# Patient Record
Sex: MALE | Race: WHITE
[De-identification: names, ages, dates, MRNs, and addresses within clinical notes are randomized per-mention and may not be internally consistent; named-entity substitution may affect disease eponyms.]

---

## 2017-05-08 ENCOUNTER — HOSPITAL ENCOUNTER (EMERGENCY)
Dept: HOSPITAL 80 - FED | Age: 77
Discharge: HOME | End: 2017-05-08
Payer: COMMERCIAL

## 2017-05-08 VITALS
DIASTOLIC BLOOD PRESSURE: 94 MMHG | RESPIRATION RATE: 16 BRPM | HEART RATE: 89 BPM | OXYGEN SATURATION: 96 % | TEMPERATURE: 98.1 F | SYSTOLIC BLOOD PRESSURE: 162 MMHG

## 2017-05-08 DIAGNOSIS — S68.127A: Primary | ICD-10-CM
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
## 2017-05-08 NOTE — EDPHY
H & P


Stated Complaint: lac to l paula fingetr


HPI/ROS: 





Chief complaint:  Left pinky finger laceration





History of present illness:  76-year-old male presents to the emergency 

department for left pinky finger laceration.  Patient was using a cooking knife 

when it slipped cutting the tip of his finger.  There has been bleeding.  There 

has been pain. He denies abnormal coolness or paresthesias in the finger.  He 

is moving the finger well.  His tetanus is up-to-date.





- Personal History


Current Tetanus/Diphtheria Vaccine: Yes


Current Tetanus Diphtheria and Acellular Pertussis (TDAP): Yes





- Medical/Surgical History


Hx Asthma: No


Hx Chronic Respiratory Disease: No


Hx Diabetes: No


Hx Cardiac Disease: No


Hx Renal Disease: No


Hx Cirrhosis: No


Hx Alcoholism: No


Hx HIV/AIDS: No


Hx Splenectomy or Spleen Trauma: No


Other PMH: back surgery laminectomy





- Social History


Smoking Status: Never smoked





- Physical Exam


Exam: 





General:  Alert, nontoxic


Skin:  Very small amputation of the very distal tip of the left 5th finger.  No 

deep structures visualized.


Musculoskeletal:  Patient is flexing and extending his left 5th finger in the 

DIP, PIP and MCP joint well.


Vascular:  Capillary refill brisk in the left 5th finger.  


Neurologic:  Sensation appears to be intact on the 5th finger using light touch 

and two-point discrimination.


Constitutional: 


 Initial Vital Signs











Temperature (C)  36.7 C   05/08/17 20:04


 


Heart Rate  89   05/08/17 20:04


 


Respiratory Rate  16   05/08/17 20:04


 


Blood Pressure  162/94 H  05/08/17 20:04


 


O2 Sat (%)  96   05/08/17 20:04











Allergies/Adverse Reactions: 


 





No Known Allergies Allergy (Unverified 05/08/17 20:04)


 








Home Medications: 














 Medication  Instructions  Recorded


 


NK [No Known Home Meds]  05/08/17


 


TESTOSTERONE  05/08/17














Medical Decision Making


ED Course/Re-evaluation: 





Patient seen under the supervision of my secondary supervising physician Dr. Rajeev Chauhan.  Patient presents to the emergency department for a cut to the 

tip of his left 5th finger.  The finger is neurovascularly intact, he has good 

musculoskeletal control.  His tetanus is up-to-date.  The wound is cleaned and 

dressed.  He is referred to hand surgery for recheck.  Return precautions are 

given.  Patient voiced understanding and agreement with plan.


Differential Diagnosis: 





Included but not limited to superficial soft tissue injury, deep structure 

injury, foreign body contamination





Departure





- Departure


Disposition: Home, Routine, Self-Care


Clinical Impression: 


Amputated finger


Qualifiers:


 Encounter type: initial encounter Qualified Code(s): S68.119A - Complete 

traumatic metacarpophalangeal amputation of unspecified finger, initial 

encounter





Condition: Good


Instructions:  Finger Amputation (ED)


Additional Instructions: 


Follow-up with a hand doctor for recheck





If symptoms worsen or new symptoms develop return to the emergency room for 

recheck


Referrals: 


UNKNOWN,PCP [Other] - As per Instructions


Brad Karimi MD [Medical Doctor] - As per Instructions

## 2018-01-02 ENCOUNTER — HOSPITAL ENCOUNTER (OUTPATIENT)
Dept: HOSPITAL 80 - GIMAGING | Age: 78
End: 2018-01-02
Attending: NURSE PRACTITIONER
Payer: COMMERCIAL

## 2018-01-02 DIAGNOSIS — S22.42XA: Primary | ICD-10-CM

## 2018-01-16 ENCOUNTER — HOSPITAL ENCOUNTER (OUTPATIENT)
Dept: HOSPITAL 80 - FCPNEURO | Age: 78
End: 2018-01-16
Attending: INTERNAL MEDICINE
Payer: COMMERCIAL

## 2018-01-16 DIAGNOSIS — G47.33: Primary | ICD-10-CM

## 2018-07-03 ENCOUNTER — HOSPITAL ENCOUNTER (OUTPATIENT)
Dept: HOSPITAL 80 - FED | Age: 78
Setting detail: OBSERVATION
LOS: 1 days | Discharge: HOME | End: 2018-07-04
Attending: INTERNAL MEDICINE | Admitting: INTERNAL MEDICINE
Payer: COMMERCIAL

## 2018-07-03 DIAGNOSIS — G47.33: ICD-10-CM

## 2018-07-03 DIAGNOSIS — J98.11: ICD-10-CM

## 2018-07-03 DIAGNOSIS — Z82.49: ICD-10-CM

## 2018-07-03 DIAGNOSIS — R03.0: ICD-10-CM

## 2018-07-03 DIAGNOSIS — S22.42XD: ICD-10-CM

## 2018-07-03 DIAGNOSIS — R53.83: ICD-10-CM

## 2018-07-03 DIAGNOSIS — I34.1: ICD-10-CM

## 2018-07-03 DIAGNOSIS — R07.9: Primary | ICD-10-CM

## 2018-07-03 DIAGNOSIS — R94.31: ICD-10-CM

## 2018-07-03 LAB — PLATELET # BLD: 241 10^3/UL (ref 150–400)

## 2018-07-03 PROCEDURE — G0378 HOSPITAL OBSERVATION PER HR: HCPCS

## 2018-07-03 PROCEDURE — 71046 X-RAY EXAM CHEST 2 VIEWS: CPT

## 2018-07-03 PROCEDURE — 99285 EMERGENCY DEPT VISIT HI MDM: CPT

## 2018-07-03 PROCEDURE — 93005 ELECTROCARDIOGRAM TRACING: CPT

## 2018-07-03 PROCEDURE — 93306 TTE W/DOPPLER COMPLETE: CPT

## 2018-07-03 NOTE — GHP
[f rep st]



                                                            HISTORY AND PHYSICAL





DATE OF ADMISSION:  07/03/2018



CHIEF COMPLAINT:  Chest pain.



HISTORY:  This is a 77-year-old man with a past medical history that includes costochondritis, recent
 rib fractures and mitral valve prolapse, who presents with chest pain and decreased energy for the l
ast several days.  The patient notes that his chest pain is located in his left sternal region and it
 feels like pressure without radiation or other associated symptoms.  He notes it is slightly differe
nt than his prior costochondritis episode.  However, after obtaining an echocardiogram in the emergen
cy department, he has some tenderness located over the site of the probe that he states is more simil
ar to his costochondritis.  He has had increased blood pressure and heart rate as well over the cours
e of the day today, and for that reason, he decided to come to the emergency room for further evaluat
ion.



PAST MEDICAL HISTORY:  Includes:

1.  Mitral valve prolapse.

2.  Costochondritis.

3.  Recent rib fractures.



PAST SURGICAL HISTORY:  

1.  Laminectomy.

2.  Vein surgery.



FAMILY HISTORY:  The patient has a paternal grandfather who had an MI.  Father also with heart diseas
e, but lived to be .



SOCIAL HISTORY:  The patient is a nonsmoker, nondrinker, nondrug user.  He lives independently.  He i
s not .



REVIEW OF SYSTEMS:  10-point review of systems obtained, negative except as per HPI.



HOME MEDICATIONS:  Multivitamin.



ALLERGIES:  No known drug allergies.



PHYSICAL EXAM:  VITAL SIGNS:  /92, heart rate 70, respiratory rate 18, O2 sats 98% on room air,
 temperature is 36.8.  GENERAL APPEARANCE:  This is a well-developed, well-nourished man.  He is awak
e and alert.  He is in no acute distress.  EYES:  Anicteric.  CARDIOVASCULAR:  Regular rate and rhyth
m, occasional systolic click.  PULMONARY:  CTA bilaterally.  Normal work of breathing.  ABDOMEN:  Sof
t, nontender, nondistended.  EXTREMITIES:  No clubbing, cyanosis, or edema.  SKIN:  Hyperpigmentation
 and varicose veins consistent with chronic venous stasis.



CLINICAL DATA:  Labs reviewed and notable for white blood cell count of 7.27, hematocrit of 39, plate
lets of 241.  Chemistry is unremarkable.  Troponin is 0. 



EKG, personally reviewed and interpreted, shows sinus rhythm, evidence of first-degree AV block. 



Chest x-ray, personally reviewed and interpreted, shows no acute findings.



ASSESSMENT AND PLAN:  This is a 77-year-old man with past medical history of costochondritis and mitr
al valve prolapse, presenting with chest pain.

1.  Chest pain, somewhat concerning, though overall does sound atypical.  Initial troponin negative. 
 Echocardiogram performed and report pending.  Note, does appear his ejection fraction was normal.  T
his could certainly be a recurrent costochondritis, especially given recurrent pain with pressure ove
r the area.  Plan will be to trend troponins overnight and monitor on telemetry.  Cardiology has been
 consulted and will defer to them regarding stress test modality, but expect the patient will require
 stress test prior to discharge.  Chest x-ray does not reveal any continued evidence of rib fracture,
 so suspect this is not the etiology for his pain.

2.  Mitral valve prolapse.  Echocardiogram has been performed with report pending.  He does not appea
r to be in acute heart failure.

3.  History of costochondritis as per problem #1.  Patient is currently chest pain free.

4.  Recent rib fractures status post mechanical fall.  They do appear to be healed on chest x-ray.

5.  Observation status.  Suspect patient will need less than 48 hours stay for evaluation and managem
ent of above.

6.  Patient is new to my care.  Old records reviewed and summarized as per HPI and past medical histo
ry.  Care plan reviewed with ER physician including plans for Cardiology consult.





Job #:  [id number]

## 2018-07-03 NOTE — CPEKG
Heart Rate: 92

RR Interval: 652

P-R Interval: 220

QRSD Interval: 100

QT Interval: 356

QTC Interval: 441

P Axis: 87

QRS Axis: 92

T Wave Axis: 70

EKG Severity - ABNORMAL ECG -

EKG Impression: SINUS RHYTHM

EKG Impression: FIRST DEGREE AV BLOCK

EKG Impression: BIATRIAL ABNORMALITIES

EKG Impression: CONSIDER RIGHT VENTRICULAR HYPERTROPHY

Electronically Signed By: Phu Strickland 03-Jul-2018 20:05:11

## 2018-07-03 NOTE — EDPHY
H & P


Stated Complaint: cp/fatigue x 3 days


Time Seen by Provider: 07/03/18 12:51





- Personal History


Current Tetanus Diphtheria and Acellular Pertussis (TDAP): Yes





- Medical/Surgical History


Hx Asthma: No


Hx Chronic Respiratory Disease: No


Hx Diabetes: No


Hx Cardiac Disease: Yes


Hx Renal Disease: No


Hx Cirrhosis: No


Hx Alcoholism: No


Hx HIV/AIDS: No


Hx Splenectomy or Spleen Trauma: No


Other PMH: back surgery laminectomy.  sleep apnea.  mitral valve prolapse





- Social History


Smoking Status: Never smoked


Constitutional: 


 Initial Vital Signs











Temperature (C)  36.7 C   07/03/18 12:12


 


Heart Rate  103 H  07/03/18 12:12


 


Respiratory Rate  18   07/03/18 12:12


 


Blood Pressure  159/93 H  07/03/18 12:12


 


O2 Sat (%)  93   07/03/18 12:12








 











O2 Delivery Mode               Room Air














Allergies/Adverse Reactions: 


 





No Known Allergies Allergy (Verified 07/03/18 12:11)


 








Home Medications: 














 Medication  Instructions  Recorded


 


Multivitamins [Multivitamin (*)] 1 each PO DAILY 07/03/18














Medical Decision Making





- Diagnostics


Imaging Results: 


 Imaging Impressions





Chest X-Ray  07/03/18 12:59


Impression: Left basilar subsegmental atelectasis. No acute cardiopulmonary 

process.


 


 


 











Imaging: Discussed imaging studies w/ On call Radiologist, I viewed and 

interpreted images myself


ED Course/Re-evaluation: 





CHIEF COMPLAINT: Chest pain 





HISTORY OF PRESENT ILLNESS:  


The patient is a 78 y/o male with a history of mitral valve prolapse, 

costochondritis, and fractured ribs complaining of worsening chest pain. Around 

six months ago he fractured 2 ribs, for which he now sees an osteopath. Since 

fracturing the ribs he has had decreasing energy levels and very mild chest 

discomfort. For the past couple of days this chest pain has increased and 

occasionally radiates into his left shoulder. This pain feels more pressure and 

dull than prior costochondritis episodes. This morning his blood pressure was 

150/90, which is higher than his normal 120/80; his pulse was also elevated at 

100. Due to these vital signs he decided to present to the emergency 

department. He did see Dr. Louie, cardiologist, around 5 years ago for a 

cardiac stress test and an echocardiogram. Denies nausea, vomiting, shortness 

of breath, dizziness, numbness, paresthesias, fever.





REVIEW OF SYSTEMS:  





A 10 point review of systems was performed and is negative with the exception 

of the elements mentioned in the history of present illness.





PHYSICAL EXAM:  





HR, BP, O2 Sat, RR.  Temp noted


General Appearance:  Alert, well hydrated, appropriate, and non-toxic appearing.


Head:  Atraumatic without scalp tenderness or obvious injury


Eyes:  Pupils equal, round, reactive to light and accommodation, EOMI, no trauma

, no injection.


Ears:  Clear bilaterally, no perforation, normal landmarks


Nose:  Atraumatic, no rhinorrhea, clear.


Throat:  There is no erythema or exudates, no lesions, normal tonsils, mucus 

membranes moist.


Neck:  Supple, nontender, no lymphadenopathy.


Respiratory:  No retractions, no distress, no wheezes, and no accessory muscle 

use.  Lungs are clear to auscultation bilaterally.


Cardiovascular:  Regular rate and rhythm, no murmurs, rubs, or gallops. 

Bilateral carotid, radial, dorsalis pedis, and posterior tibial pulses intact. 

Good capillary refill all extremities.


Gastrointestinal:  Abdomen is soft, nontender, non-distended, no masses, no 

rebound, no guarding, no peritoneal signs.


Musculoskeletal:  Normal active ROM of all extremities, atraumatic.


Neurological:  Alert, appropriate, and interactive. Non-focal neuro.


Skin:  No rashes, good turgor, no nodules on palpation.





Past medical history: Mitral valve prolapse, costochondritis, fractured ribs, 

sleep apnea


Past surgical history: Laminectomy


Family history: Denies


Social history: Lives in [place], single, retired





DIAGNOSTICS/PROCEDURES/CRITICAL CARE TIME:  





EKG: The 12 lead EKG was interpreted by myself as sinus rhythm with a rate of 92

, first degree AV block, nonspecific ST-T changes in the inferior lateral 

leads. See hard copy and/or "tracemaster" electronic copy for interpretation.





Chest x-ray: Left basilar subsegmental atelectasis





Echocardiogram: Pending at time of going to the floor.





DIFFERENTIAL DIAGNOSIS:   


The differential diagnosis for the patient's chest pain included but was not 

limited to myocardial ischemia, pulmonary embolus, chest wall pain, pleural 

inflammation, and pulmonary infectious causes.





MEDICAL DECISION MAKING:  


The patient is a 78 y/o male with a history of mitral valve prolapse, 

costochondritis, and fractured ribs presenting with worsening chest pain and 

pressure. His physical exam is normal. EKG, chest x-ray, and labs ordered; 

324mg PO Aspirin given.





1221: I interpreted EKG as sinus rhythm with a rate of 92, first degree AV block

, nonspecific ST-T changes in the inferior lateral leads. In spite of patient's 

age and progressing symptoms, he will need to be admitted for further workup 

and observation.





1259: Patient's chest x-ray reveals left basilar subsegmental atelectasis.





1314: Consulted with Dr. Alvarado, cardiologist, who agrees to consult on this 

patient during his admission.





1315: Patient has a normal troponin.





1338: Consulted with hospitalist service, Dr. Quijano accepts admission of 

this patient to the PCU.





1342: Reassessed patient and discussed laboratory and imaging findings; 

echocardiogram ordered. I have also discussed plan for admission which he is 

comfortable with.








- Data Points


Laboratory Results: 


 Laboratory Results





 07/03/18 12:25 





 07/03/18 12:25 





 











  07/03/18 07/03/18 07/03/18





  12:30 12:25 12:25


 


WBC      7.27 10^3/uL 10^3/uL





     (3.80-9.50) 


 


RBC      4.36 10^6/uL L 10^6/uL





     (4.40-6.38) 


 


Hgb      13.4 g/dL L g/dL





     (13.7-17.5) 


 


Hct      39.0 % L %





     (40.0-51.0) 


 


MCV      89.4 fL fL





     (81.5-99.8) 


 


MCH      30.7 pg pg





     (27.9-34.1) 


 


MCHC      34.4 g/dL g/dL





     (32.4-36.7) 


 


RDW      15.2 % %





     (11.5-15.2) 


 


Plt Count      241 10^3/uL 10^3/uL





     (150-400) 


 


MPV      9.3 fL fL





     (8.7-11.7) 


 


Neut % (Auto)      80.0 % H %





     (39.3-74.2) 


 


Lymph % (Auto)      12.1 % L %





     (15.0-45.0) 


 


Mono % (Auto)      6.6 % %





     (4.5-13.0) 


 


Eos % (Auto)      0.3 % L %





     (0.6-7.6) 


 


Baso % (Auto)      0.6 % %





     (0.3-1.7) 


 


Nucleat RBC Rel Count      0.0 % %





     (0.0-0.2) 


 


Absolute Neuts (auto)      5.82 10^3/uL 10^3/uL





     (1.70-6.50) 


 


Absolute Lymphs (auto)      0.88 10^3/uL L 10^3/uL





     (1.00-3.00) 


 


Absolute Monos (auto)      0.48 10^3/uL 10^3/uL





     (0.30-0.80) 


 


Absolute Eos (auto)      0.02 10^3/uL L 10^3/uL





     (0.03-0.40) 


 


Absolute Basos (auto)      0.04 10^3/uL 10^3/uL





     (0.02-0.10) 


 


Absolute Nucleated RBC      0.00 10^3/uL 10^3/uL





     (0-0.01) 


 


Immature Gran %      0.4 % %





     (0.0-1.1) 


 


Immature Gran #      0.03 10^3/uL 10^3/uL





     (0.00-0.10) 


 


Sodium    136 mEq/L mEq/L  





    (135-145)  


 


Potassium    3.7 mEq/L mEq/L  





    (3.3-5.0)  


 


Chloride    103 mEq/L mEq/L  





    ()  


 


Carbon Dioxide    27 mEq/l mEq/l  





    (22-31)  


 


Anion Gap    6 mEq/L L mEq/L  





    (8-16)  


 


BUN    14 mg/dL mg/dL  





    (7-23)  


 


Creatinine    0.7 mg/dL mg/dL  





    (0.7-1.3)  


 


Estimated GFR    > 60   





    


 


Glucose    100 mg/dL mg/dL  





    ()  


 


Calcium    9.2 mg/dL mg/dL  





    (8.5-10.4)  


 


POC Troponin I  0.00 ng/mL ng/mL    





   (0.00-0.08)   


 


NT-Pro-B Natriuret Pep    209 pg/mL pg/mL  





    (0-450)  











Medications Given: 


 








Discontinued Medications





Aspirin (Aspirin)  324 mg PO EDNOW ONE


   Stop: 07/03/18 13:00


   Last Admin: 07/03/18 13:21 Dose:  324 mg





Point of Care Test Results: 


 Chemistry











  07/03/18





  12:30


 


POC Troponin I  0.00 ng/mL ng/mL





   (0.00-0.08) 














Departure





- Departure


Disposition: [facility] Inpatient Acute


Clinical Impression: 


 Chest wall pain, Abnormal EKG, Atelectasis of left lung





Chest pain


Qualifiers:


 Chest pain type: other chest pain Qualified Code(s): R07.89 - Other chest pain





Condition: Fair


Report Scribed for: Phu Strickland


Report Scribed by: Basilia Jason


Date of Report: 07/03/18


Time of Report: 12:52

## 2018-07-03 NOTE — ECHO
[email or web address]





[address] [address] 

Main: [phone] 



Fax: 



Transthoracic Echocardiogram 

Name:             WILMAR HARKINS                             MR#:

[id number]

Study Date:       2018                              Study Time:

03:20 PM

YOB: 1940                              Age:

77 year(s)

Height:           180.3 cm (71 in.)                       Weight:

63.5 kg (140 lb.)

BSA:              1.81 m2                                 Gender:

Male

Examination:      Echo                                    Indication:

Chest pain/hx MVP

Image Quality:                                            Contrast: 

Requested by:     Phu Strickland                            BP:

143 mmHg/92 mmHg

Heart Rate:                                               Rhythm: 

Indication:       Chest pain/hx MVP 



Procedure Staff 

Ultrasound Technician:   Suzanna Becker RDCS 

Reading Physician:       Rodo Alvarado MD 

Requesting Provider: 



Conclusions:          Normal size left ventricle.  

No LV hypertrophy.  

Global hypercontractility of the left ventricle.  

The ejection fraction is estimated to be 70-75 %.  

No regional wall motion abnormality.  

Normal diastolic LV function.  

Mild mitral valve regurgitation is present.  

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 Mild anterior

leaflet prolapse.

Mild tricuspid regurgitation is present. 



Measurements: 

Chambers                    Valvular Assessment AV/MV

Valvular Assessment TV/PV



Normal                                   Normal

Normal

Name         Value     Range              Name         Value Range

Name          Value Range

Ao Kaley (MM): 4.0 cm    (2.2 cm-3.7            AV meanP mmHg ( -

)          TR Vmax:      2.55 mm/s ( - )



cm)                SIDDHARTH (VTI):   3.0 cm ( - )          TR PGmax:     26

mmHg ( - )

IVSd (2D):   0.6 cm (0.6 cm-1.1               MV E Vmax:   1.07 m/s (

- )        syst. PAP: 31 mmHg ( - )



cm)                MV A Vmax:   0.93 m/s ( - )  

LVDd (2D):   4.2 cm    (4.2 cm-5.9            MV E/A:      1.15 ( - )





cm)   

LVDs (2D):   1.8 cm    (2.1 cm-4 



cm)   

LVPWd (2D):  0.8 cm    (0.6 cm-1 



cm)   

LVOTd        2.1 cm    2.1 cm mm 

LVEF (MOD4): 79 %      (>=55 %)   

EF Range:    70-75 % 



Continued Measurements: 

Chambers                    Valvular Assessment AV/MV

Valvular Assessment TV/PV



Patient: WILMAR HARKINS                          MRN: [id number]

Study Date: 2018   Page 1 of 2

03:20 PM 









Name                       Value  Name                      Value

Name                      Value

LADs:                    3.1 cm               MV E' Septal:

0.08  m/s   CVP (est.):             5 mmHg



MV E/E' Septal:          13.20    

MV E/E' Lateral:         13.70    



Additional Vessels  



Name                       Value  

Ao Ascending:            3.6 cm    



Findings:             Left Ventricle: 

Normal size left ventricle. No LV hypertrophy. Global

hypercontractility of the left ventricle. The

ejection fraction is estimated to be 70-75 %. No regional wall motion

abnormality. Normal diastolic

LV function.  

Right Ventricle: 

Normal size right ventricle.  

Left Atrium: 

The left atrium is normal in size.  

Right Atrium: 

The right atrium is normal in size.  

Mitral Valve: 

Mild mitral valve regurgitation is present. Myxomatous mitral valve.

There is mild chordal ALIZA. Mild

anterior leaflet prolapse.  

Aortic Valve: 

The aortic valve is normal in appearance and function. The aortic

valve is tri-leaflet.

Tricuspid Valve: 

The tricuspid valve is normal in appearance and function. Mild

tricuspid regurgitation is present.

Pulmonic Valve: 

The pulmonic valve is normal in appearance and function.  

Aorta: 

The aorta is normal.  

Pericardium: 

No pericardial effusion. 







Electronically signed by Rodo Alvarado MD on 2018 at 04:13 PM 

(No Signature Object) 



Patient: WILMAR HARKINS                          MRN: [id number]

Study Date: 2018   Page 2 of 2

03:20 PM 







[id number]

## 2018-07-03 NOTE — GCON
[f 
rep st]



                                                                    CONSULTATION





CARDIAC CONSULTATION



DATE OF CONSULTATION:  07/03/2018





CHIEF COMPLAINT:  Chest pain and fatigue.



HISTORY OF PRESENT ILLNESS:  Gavino Terry is a 77-year-old male with a history 
of mitral valve prolapse and costochondritis, who presents with chest 
discomfort and fatigue.  He also broke 2 ribs in January and has intermittent 
chest discomfort since that time.  A few weeks ago, he developed a different 
discomfort on the left side of his chest, which is not associated with anything 
in particular.  He did note that it may be slightly worse when walking up an 
incline, but he is able to ride his stationary bike for 30 minutes without 
discomfort.  He has also noted fatigue, which has been present for the past 2 
months.  He checked his blood pressure this morning, which was 150/90, and 
therefore, he presented to the hospital.  On admission, his troponin is 
negative and an EKG revealed normal sinus rhythm without any significant ST-T 
wave changes. 



He denies any history of fever, chills, cough, sore throat, or runny nose.



PAST MEDICAL HISTORY:  Mitral valve prolapse, costochondritis.



PAST SURGICAL HISTORY:  Noncontributory.



FAMILY HISTORY:  His grandfather had an MI starting in his 60s.  He had an 
uncle with coronary disease diagnosed at the age of 75.  His father had an MI 
in his early 70s.



SOCIAL HISTORY:  He is currently retired.  He denies any history of tobacco 
use.  He denies any significant caffeine or alcohol use.



HOME MEDICATIONS:  Multivitamin.



ALLERGIES:  No known drug allergies.



ROS: A 10 point review of systems is negative except for what is stated in the 
history and physical.



PHYSICAL EXAMINATION:  GENERAL:  Patient appears in no acute distress.  VITALS:
  Blood pressure 151/87, heart rate 80, oxygen saturation 98%.  Afebrile.  EYES
:  Normal pupils.  NECK:  No carotid bruits or JVD present.  LUNGS:  Clear to 
auscultation.  No wheezes, rhonchi, or crackles auscultated.  CARDIAC:  Regular 
rate and rhythm without any significant murmurs, rubs, or gallops appreciated.  
ABDOMEN:  Soft, nontender, nondistended.  Bowel sounds present.  EXTREMITIES:  
Palpable pulses bilaterally without any evidence of edema.  NEUROLOGIC:  
Nonfocal.  SKIN:  No obvious rashes or ecchymosis identified.  PSYCHIATRIC:  
Mood and affect appropriate.



LABORATORY:  WBC 7.27, hemoglobin 13.4, hematocrit 39, platelets 241.  Troponin 
negative x1.  .  BMP within normal limits.



REPORTS:  Echocardiogram revealed preserved LV function with an ejection 
fraction of 70% to 75%.  There are no wall motion abnormalities.  He has a 
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  
There is mild mitral regurgitation and mild tricuspid regurgitation.  EKG 
reveals normal sinus rhythm with a heart rate of 92 and nonspecific ST-T wave 
changes.  He has evidence of an incomplete right bundle branch block and first-
degree AV block with a NJ interval of 220.



ASSESSMENT:  The patient is a 77-year-old male with a history of mitral valve 
prolapse and costochondritis who presents with chest discomfort and fatigue.



PLAN:  The patient presents with chest discomfort with multiple possible 
etiologies for his pain.  He has a history of costochondritis as well as broke 
2 ribs in January.  He states this discomfort is different than what he 
experienced in the past.  His initial troponin is negative and his EKG reveals 
normal sinus rhythm with nonspecific ST-T wave changes.  If he rules out for a 
myocardial infarction, I would consider an outpatient nuclear stress test for 
further assessment.  He is also complaining of fatigue.  His echocardiogram 
revealed preserved LV function without any wall motion abnormalities.  He does 
have evidence of mitral valve prolapse with mild ALIZA.  He also had mitral 
regurgitation, which was mild.  I do not think this is contributing to his 
symptoms, but should be tracked with an echo in 1 year.  I will add a TSH to 
his morning labs. 



He was hypertensive on admission.  His blood pressure is better controlled at 
this point, but still elevated.  It would be reasonable to add lisinopril 10 mg 
for better hypertensive control.





Job #:  [id number]

MTDD

## 2018-07-04 VITALS — DIASTOLIC BLOOD PRESSURE: 81 MMHG | SYSTOLIC BLOOD PRESSURE: 127 MMHG

## 2018-07-04 NOTE — ASMTLACE
LACE

 

Length of stay for            Answers:  Less than 1 day                       

current admission                                                             

Comorbidities - select        Answers:  Other                         Notes:  MVP, laminectomy

all that apply                                                                

# of Emergency department     Answers:  1-2                                   

visits in the last 6                                                          

months                                                                        

Score: 2

 

Date Signed:  07/04/2018 10:32 AM

Electronically Signed By:Nora Barry RN

## 2018-07-04 NOTE — CPEKG
Heart Rate: 85

RR Interval: 706

P-R Interval: 192

QRSD Interval: 98

QT Interval: 376

QTC Interval: 447

P Axis: 83

QRS Axis: 77

T Wave Axis: 69

EKG Severity - ABNORMAL ECG -

EKG Impression: SINUS RHYTHM

EKG Impression: LEFT ATRIAL ABNORMALITY

EKG Impression: INCOMPLETE RIGHT BUNDLE BRANCH BLOCK

EKG Impression: BORDERLINE T ABNORMALITIES, ANT-LAT LEADS

Electronically Signed By: Rodo Alvarado 04-Jul-2018 11:01:03

## 2018-07-04 NOTE — ASMTCMCOM
CM Note

 

CM Note                       

Notes:

Chart reviewed. Patient has been medically cleared for discharge to home. No needs identified CM 

available should needs arise.



Plan: DC to home independently.

 

Date Signed:  07/04/2018 10:34 AM

Electronically Signed By:Nora Barry RN

## 2018-07-10 ENCOUNTER — HOSPITAL ENCOUNTER (OUTPATIENT)
Dept: HOSPITAL 80 - BHFA | Age: 78
End: 2018-07-10
Attending: INTERNAL MEDICINE
Payer: COMMERCIAL

## 2018-07-10 DIAGNOSIS — R07.9: Primary | ICD-10-CM

## 2021-06-11 NOTE — GDS
1619- Pt arrives to PACU from OR on 6L of oxygen via mask. Report received. CBI infusing. Clear urine in CBI bag, Pt denies pain and nausea.     1650- Pt daughter Cecy and ex wife Carol called and updated on POC. Additional bag of belongings (in green plastic bag) obtained from Pat.     1715- Pt continues to deny pain. Attempt to call report to floor. RN to call back.     1536- Report called to Chiara YAP, signed and held orders discussed. Pt tolerating sips of water.     1742  - Pt taken to floor by transport on 1L of oxygen (tank full), belongings on gurney. Pt comfortable upon leaving PACU.      [f rep st]



                                                             DISCHARGE SUMMARY





DISCHARGE DIAGNOSIS:  Chest pain, suspect musculoskeletal.



HISTORY:  The patient is a 77-year-old male who presented with left-sided chest pain.  He has history
 of costochondritis as well as recent rib fractures.  Initially, he was concerned his chest pain is d
ifferent.  He, did, however have reproducible chest wall tenderness to palpation.  Cardiology saw him
 in consultation.  They recommended outpatient stress testing.  His EKGs and troponins remained uncha
nged throughout this hospitalization.



DISCHARGE MEDICATIONS:  Please see computer record for full detailed list.



NEW MEDICATIONS:  There were no new medications given at the time of hospital discharge.



ADDITIONAL DISCHARGE INSTRUCTIONS:  

1.  Follow up for outpatient stress test within 1 week.

2.  Return to the emergency room for return of symptoms. 

The patient was seen examined by me on the day of discharge.





Job #:  [id number]